# Patient Record
Sex: MALE | Race: WHITE | URBAN - METROPOLITAN AREA
[De-identification: names, ages, dates, MRNs, and addresses within clinical notes are randomized per-mention and may not be internally consistent; named-entity substitution may affect disease eponyms.]

---

## 2017-07-28 ENCOUNTER — OUTPATIENT (OUTPATIENT)
Dept: OUTPATIENT SERVICES | Facility: HOSPITAL | Age: 32
LOS: 1 days | Discharge: HOME | End: 2017-07-28

## 2017-07-28 ENCOUNTER — APPOINTMENT (OUTPATIENT)
Dept: INTERNAL MEDICINE | Facility: CLINIC | Age: 32
End: 2017-07-28

## 2017-07-28 VITALS
DIASTOLIC BLOOD PRESSURE: 76 MMHG | HEIGHT: 66 IN | HEART RATE: 69 BPM | BODY MASS INDEX: 29.25 KG/M2 | SYSTOLIC BLOOD PRESSURE: 117 MMHG | WEIGHT: 182 LBS

## 2017-07-28 DIAGNOSIS — Z00.01 ENCOUNTER FOR GENERAL ADULT MEDICAL EXAMINATION WITH ABNORMAL FINDINGS: ICD-10-CM

## 2017-07-28 DIAGNOSIS — G43.909 MIGRAINE, UNSPECIFIED, NOT INTRACTABLE, WITHOUT STATUS MIGRAINOSUS: ICD-10-CM

## 2017-08-12 ENCOUNTER — RESULT REVIEW (OUTPATIENT)
Age: 32
End: 2017-08-12

## 2017-08-17 LAB
25(OH)D3 SERPL-MCNC: 20 NG/ML
ESTIMATED AVERGAGE GLUCOSE (NORTH): 114 MG/DL
HBA1C MFR BLD: 5.6 %
HIV1+2 AB SPEC QL IA.RAPID: NONREACTIVE
T PALLIDUM AB SER QL: NEGATIVE
T PALLIDUM AB SER-ACNC: <0.1 INDEX
VITAMIN D2 SERPL-MCNC: <4 NG/ML
VITAMIN D3 SERPL-MCNC: 20 NG/ML

## 2017-09-05 LAB
C TRACH IGA TITR SER IF: NORMAL
C TRACH IGA+IGG+IGM SER-IMP: NORMAL
C TRACH IGG TITR SER IF: NORMAL
C TRACH IGM TITR SER IF: NORMAL
NEISSERIA GONORRHOEAE AB [TITER] IN SERUM BY COMPLEMENT FIXATION: NORMAL

## 2018-01-02 ENCOUNTER — OUTPATIENT (OUTPATIENT)
Dept: OUTPATIENT SERVICES | Facility: HOSPITAL | Age: 33
LOS: 1 days | Discharge: HOME | End: 2018-01-02

## 2018-01-02 ENCOUNTER — APPOINTMENT (OUTPATIENT)
Dept: INTERNAL MEDICINE | Facility: CLINIC | Age: 33
End: 2018-01-02

## 2018-01-02 VITALS
HEART RATE: 69 BPM | DIASTOLIC BLOOD PRESSURE: 71 MMHG | HEIGHT: 66 IN | BODY MASS INDEX: 29.09 KG/M2 | WEIGHT: 181 LBS | SYSTOLIC BLOOD PRESSURE: 104 MMHG

## 2018-01-02 DIAGNOSIS — R07.89 OTHER CHEST PAIN: ICD-10-CM

## 2018-01-02 DIAGNOSIS — R21 RASH AND OTHER NONSPECIFIC SKIN ERUPTION: ICD-10-CM

## 2018-01-02 DIAGNOSIS — E55.9 VITAMIN D DEFICIENCY, UNSPECIFIED: ICD-10-CM

## 2018-01-02 DIAGNOSIS — Z00.00 ENCOUNTER FOR GENERAL ADULT MEDICAL EXAMINATION W/OUT ABNORMAL FINDINGS: ICD-10-CM

## 2018-01-02 DIAGNOSIS — R79.89 OTHER SPECIFIED ABNORMAL FINDINGS OF BLOOD CHEMISTRY: ICD-10-CM

## 2018-01-02 DIAGNOSIS — R51 HEADACHE: ICD-10-CM

## 2018-01-02 RX ORDER — UBIDECARENONE/VIT E ACET 100MG-5
50 MCG CAPSULE ORAL
Qty: 30 | Refills: 2 | Status: ACTIVE | COMMUNITY
Start: 2018-01-02

## 2018-01-23 ENCOUNTER — OUTPATIENT (OUTPATIENT)
Dept: OUTPATIENT SERVICES | Facility: HOSPITAL | Age: 33
LOS: 1 days | Discharge: HOME | End: 2018-01-23

## 2018-01-23 ENCOUNTER — APPOINTMENT (OUTPATIENT)
Dept: CARDIOLOGY | Facility: CLINIC | Age: 33
End: 2018-01-23

## 2018-01-23 VITALS
HEART RATE: 80 BPM | BODY MASS INDEX: 28.28 KG/M2 | HEIGHT: 66 IN | WEIGHT: 176 LBS | DIASTOLIC BLOOD PRESSURE: 75 MMHG | SYSTOLIC BLOOD PRESSURE: 110 MMHG

## 2018-02-20 ENCOUNTER — OUTPATIENT (OUTPATIENT)
Dept: OUTPATIENT SERVICES | Facility: HOSPITAL | Age: 33
LOS: 1 days | Discharge: HOME | End: 2018-02-20

## 2018-02-20 ENCOUNTER — APPOINTMENT (OUTPATIENT)
Dept: CARDIOLOGY | Facility: CLINIC | Age: 33
End: 2018-02-20

## 2018-02-20 VITALS
DIASTOLIC BLOOD PRESSURE: 78 MMHG | WEIGHT: 177.06 LBS | HEART RATE: 70 BPM | BODY MASS INDEX: 28.45 KG/M2 | SYSTOLIC BLOOD PRESSURE: 110 MMHG | HEIGHT: 66 IN

## 2018-02-20 DIAGNOSIS — R07.89 OTHER CHEST PAIN: ICD-10-CM

## 2018-03-05 ENCOUNTER — APPOINTMENT (OUTPATIENT)
Age: 33
End: 2018-03-05

## 2018-04-16 ENCOUNTER — APPOINTMENT (OUTPATIENT)
Age: 33
End: 2018-04-16

## 2018-04-16 ENCOUNTER — OUTPATIENT (OUTPATIENT)
Dept: OUTPATIENT SERVICES | Facility: HOSPITAL | Age: 33
LOS: 1 days | Discharge: HOME | End: 2018-04-16

## 2018-04-16 VITALS
DIASTOLIC BLOOD PRESSURE: 68 MMHG | WEIGHT: 182 LBS | SYSTOLIC BLOOD PRESSURE: 100 MMHG | HEART RATE: 74 BPM | BODY MASS INDEX: 29.38 KG/M2

## 2018-04-24 ENCOUNTER — OUTPATIENT (OUTPATIENT)
Dept: OUTPATIENT SERVICES | Facility: HOSPITAL | Age: 33
LOS: 1 days | Discharge: HOME | End: 2018-04-24

## 2018-04-24 ENCOUNTER — LABORATORY RESULT (OUTPATIENT)
Age: 33
End: 2018-04-24

## 2018-04-24 DIAGNOSIS — R74.8 ABNORMAL LEVELS OF OTHER SERUM ENZYMES: ICD-10-CM

## 2018-04-24 DIAGNOSIS — R79.89 OTHER SPECIFIED ABNORMAL FINDINGS OF BLOOD CHEMISTRY: ICD-10-CM

## 2018-04-24 LAB
IRON SATN MFR SERPL: 49 %
IRON SERPL-MCNC: 182 UG/DL
TIBC SERPL-MCNC: 374 UG/DL
UIBC SERPL-MCNC: 192 UG/DL

## 2018-04-25 LAB
CERULOPLASMIN SERPL-MCNC: 28 MG/DL
FERRITIN SERPL-MCNC: 149 NG/ML
HAV IGG+IGM SER QL: REACTIVE
HAV IGM SER QL: NONREACTIVE
HBV CORE IGG+IGM SER QL: NONREACTIVE
HBV DNA # SERPL NAA+PROBE: NOT DETECTED
HBV SURFACE AB SER QL: REACTIVE
HBV SURFACE AG SER QL: NONREACTIVE
HCV AB SER QL: NONREACTIVE
HCV S/CO RATIO: 0.08 S/CO
HEPB DNA PCR LOG: NOT DETECTED LOGIU/ML
IGA SER QL IEP: 271 MG/DL
IGG SER QL IEP: 1040 MG/DL
IGM SER QL IEP: 54 MG/DL
MITOCHONDRIA AB SER IF-ACNC: NORMAL
SMOOTH MUSCLE AB SER QL IF: ABNORMAL

## 2018-04-26 LAB
ANA PAT FLD IF-IMP: ABNORMAL
ANACR T: ABNORMAL

## 2018-06-05 ENCOUNTER — APPOINTMENT (OUTPATIENT)
Dept: INTERNAL MEDICINE | Facility: CLINIC | Age: 33
End: 2018-06-05

## 2018-06-18 ENCOUNTER — APPOINTMENT (OUTPATIENT)
Dept: HEPATOLOGY | Facility: CLINIC | Age: 33
End: 2018-06-18

## 2019-04-26 ENCOUNTER — EMERGENCY (EMERGENCY)
Facility: HOSPITAL | Age: 34
LOS: 0 days | Discharge: HOME | End: 2019-04-26
Admitting: EMERGENCY MEDICINE
Payer: SUBSIDIZED

## 2019-04-26 VITALS
RESPIRATION RATE: 18 BRPM | OXYGEN SATURATION: 99 % | DIASTOLIC BLOOD PRESSURE: 83 MMHG | TEMPERATURE: 98 F | SYSTOLIC BLOOD PRESSURE: 130 MMHG | HEART RATE: 93 BPM

## 2019-04-26 VITALS — WEIGHT: 190.04 LBS

## 2019-04-26 DIAGNOSIS — Z23 ENCOUNTER FOR IMMUNIZATION: ICD-10-CM
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
## 2019-04-26 DIAGNOSIS — M54.5 LOW BACK PAIN: ICD-10-CM
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
## 2019-04-26 DIAGNOSIS — S61.011A LACERATION WITHOUT FOREIGN BODY OF RIGHT THUMB WITHOUT DAMAGE TO NAIL, INITIAL ENCOUNTER: ICD-10-CM
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
## 2019-04-26 DIAGNOSIS — M54.9 DORSALGIA, UNSPECIFIED: ICD-10-CM

## 2019-04-26 PROCEDURE — 99283 EMERGENCY DEPT VISIT LOW MDM: CPT | Mod: 25

## 2019-04-26 PROCEDURE — 12001 RPR S/N/AX/GEN/TRNK 2.5CM/<: CPT

## 2019-04-26 RX ORDER — METHOCARBAMOL 500 MG/1
1 TABLET, FILM COATED ORAL
Qty: 21 | Refills: 0 | OUTPATIENT
Start: 2019-04-26 | End: 2019-05-02

## 2019-04-26 RX ORDER — METHOCARBAMOL 500 MG/1
1000 TABLET, FILM COATED ORAL ONCE
Qty: 0 | Refills: 0 | Status: COMPLETED | OUTPATIENT
Start: 2019-04-26 | End: 2019-04-26

## 2019-04-26 RX ORDER — METHOCARBAMOL 500 MG/1
2 TABLET, FILM COATED ORAL
Qty: 30 | Refills: 0 | OUTPATIENT
Start: 2019-04-26 | End: 2019-04-30

## 2019-04-26 RX ORDER — KETOROLAC TROMETHAMINE 30 MG/ML
30 SYRINGE (ML) INJECTION ONCE
Qty: 0 | Refills: 0 | Status: DISCONTINUED | OUTPATIENT
Start: 2019-04-26 | End: 2019-04-26

## 2019-04-26 RX ORDER — TETANUS TOXOID, REDUCED DIPHTHERIA TOXOID AND ACELLULAR PERTUSSIS VACCINE, ADSORBED 5; 2.5; 8; 8; 2.5 [IU]/.5ML; [IU]/.5ML; UG/.5ML; UG/.5ML; UG/.5ML
0.5 SUSPENSION INTRAMUSCULAR ONCE
Qty: 0 | Refills: 0 | Status: COMPLETED | OUTPATIENT
Start: 2019-04-26 | End: 2019-04-26

## 2019-04-26 RX ORDER — DEXAMETHASONE 0.5 MG/5ML
10 ELIXIR ORAL ONCE
Qty: 0 | Refills: 0 | Status: COMPLETED | OUTPATIENT
Start: 2019-04-26 | End: 2019-04-26

## 2019-04-26 RX ADMIN — TETANUS TOXOID, REDUCED DIPHTHERIA TOXOID AND ACELLULAR PERTUSSIS VACCINE, ADSORBED 0.5 MILLILITER(S): 5; 2.5; 8; 8; 2.5 SUSPENSION INTRAMUSCULAR at 20:32

## 2019-04-26 RX ADMIN — METHOCARBAMOL 1000 MILLIGRAM(S): 500 TABLET, FILM COATED ORAL at 20:34

## 2019-04-26 RX ADMIN — Medication 30 MILLIGRAM(S): at 20:32

## 2019-04-26 RX ADMIN — Medication 10 MILLIGRAM(S): at 20:33

## 2019-04-26 NOTE — ED PROVIDER NOTE - CLINICAL SUMMARY MEDICAL DECISION MAKING FREE TEXT BOX
Patient here for left sided back pain after injury. No red flag signs or symptoms. recommend nsaids, muscle relaxant, follow up with rehab clinic. Patient also with laceration to right thumb, patient given tdap. Declining sutures but will accept dermabond.

## 2019-04-26 NOTE — ED ADULT NURSE NOTE - NSIMPLEMENTINTERV_GEN_ALL_ED
Implemented All Universal Safety Interventions:  [place] to call system. Call bell, personal items and telephone within reach. Instruct patient to call for assistance. Room bathroom lighting operational. Non-slip footwear when patient is off stretcher. Physically safe environment: no spills, clutter or unnecessary equipment. Stretcher in lowest position, wheels locked, appropriate side rails in place.

## 2019-04-26 NOTE — ED PROVIDER NOTE - NSFOLLOWUPCLINICS_GEN_ALL_ED_FT
[facility]  Rehabilitation  [address] [place] [address]  Phone: [phone]  Fax:   Follow Up Time: 1-3 Days

## 2019-04-26 NOTE — ED PROVIDER NOTE - NS ED ROS FT
Constitutional: no fever, chills   Cardiovascular: no chest pain, no sob, no syncope  Respiratory: no cough, no shortness of breath.  Gastrointestinal: no nausea, vomiting or diarrhea. no abdominal pain.   : no urinary sxs, no flank pain.  Musculoskeletal: + left sided lower back pain  Integumentary: +laceration right thumb  Neurological: no head injury. no headache, no dizziness, no visual changes, no UE/LE weakness or paresthesias. no neck pain or stiffness.

## 2019-04-26 NOTE — ED PROVIDER NOTE - NSFOLLOWUPINSTRUCTIONS_ED_ALL_ED_FT
Back Pain    Back pain is very common in adults. The cause of back pain is rarely dangerous and the pain often gets better over time. The cause of your back pain may not be known and may include strain of muscles or ligaments, degeneration of the spinal disks, or arthritis. Occasionally the pain may radiate down your leg(s). Over-the-counter medicines to reduce pain and inflammation are often the most helpful. Stretching and remaining active frequently helps the healing process.     SEEK IMMEDIATE MEDICAL CARE IF YOU HAVE ANY OF THE FOLLOWING SYMPTOMS: bowel or bladder control problems, unusual weakness or numbness in your arms or legs, nausea or vomiting, abdominal pain, fever, dizziness/lightheadedness.    Laceration    A laceration is a cut that goes through all of the layers of the skin and into the tissue that is right under the skin. Some lacerations heal on their own. Others need to be closed with skin adhesive strips, skin glue, stitches (sutures), or staples. Proper laceration care minimizes the risk of infection and helps the laceration to heal better.  If non-absorbable stitches or staples have been placed, they must be taken out within the time frame instructed by your healthcare provider.    SEEK IMMEDIATE MEDICAL CARE IF YOU HAVE ANY OF THE FOLLOWING SYMPTOMS: swelling around the wound, worsening pain, drainage from the wound, red streaking going away from your wound, inability to move finger or toe near the laceration, or discoloration of skin near the laceration.

## 2019-04-26 NOTE — ED ADULT NURSE NOTE - CHPI ED NUR SYMPTOMS NEG
no constipation/no difficulty bearing weight/no bowel dysfunction/no bladder dysfunction/no tingling/no motor function loss/no numbness/no anorexia/no neck tenderness/no fatigue

## 2019-04-26 NOTE — ED PROVIDER NOTE - PHYSICAL EXAMINATION
GENERAL:  well appearing, non-toxic male in no acute distress  SKIN: skin warm, pink and dry. MMM. 0.5 cm laceration to right distal thumb. cap refill < 2 sec   HEAD: NC, AT  NECK: Neck supple. No midline cervical tenderness. FROM of neck.   PULM: CTAB. Normal respiratory effort. No respiratory distress. No wheezes, stridor, rales or rhonchi. No retractions  CV: RRR, no M/R/G.   MSK: + left lumbar paraspinal tenderness, + left side straight leg raise. no midline vertebral tenderness, no chest wall tenderness. FROM of right thumb against resistance, no evidence of tendon involvement. No edema, erythema, cyanosis. Distal pulses intact.  NEURO: A+Ox3, no sensory/motor deficits. no saddle anesthesia. Strength 5/5 throughout bilaterally. Sensation of right thumb intact.   PSYCH: appropriate behavior, cooperative.

## 2019-04-26 NOTE — ED PROVIDER NOTE - OBJECTIVE STATEMENT
34 yo male presents to the ED c/o left sided lower back pain after he twisted his back to catch a falling shelf earlier today. Patient now with left sided lower back pain with radiation down leg. No head trauma. no additional injury. no prior back issues/injury. 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 Laceration still mildly bleeding. Denies fever, chills, chest pain, sob, abd pain, N/V, urinary sxs, flank pain. no UE/LE weakness or paresthesias, no saddle anesthesia, no bowel or bladder incontinence/retention, no prior back surgery.

## 2019-04-26 NOTE — ED ADULT NURSE NOTE - CHIEF COMPLAINT QUOTE
left side pain since yesterday, moved quickly out of the way from a falling shelf. C/o left side and back pain

## 2019-08-19 ENCOUNTER — EMERGENCY (EMERGENCY)
Facility: HOSPITAL | Age: 34
LOS: 0 days | Discharge: HOME | End: 2019-08-19
Attending: EMERGENCY MEDICINE | Admitting: EMERGENCY MEDICINE
Payer: SUBSIDIZED

## 2019-08-19 VITALS
SYSTOLIC BLOOD PRESSURE: 130 MMHG | TEMPERATURE: 98 F | HEART RATE: 90 BPM | OXYGEN SATURATION: 99 % | RESPIRATION RATE: 18 BRPM | DIASTOLIC BLOOD PRESSURE: 82 MMHG

## 2019-08-19 DIAGNOSIS — S61.001A UNSPECIFIED OPEN WOUND OF RIGHT THUMB WITHOUT DAMAGE TO NAIL, INITIAL ENCOUNTER: ICD-10-CM
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
## 2019-08-19 DIAGNOSIS — S61.011A LACERATION WITHOUT FOREIGN BODY OF RIGHT THUMB WITHOUT DAMAGE TO NAIL, INITIAL ENCOUNTER: ICD-10-CM
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
## 2019-08-19 PROCEDURE — 73140 X-RAY EXAM OF FINGER(S): CPT | Mod: 26,RT

## 2019-08-19 PROCEDURE — 99283 EMERGENCY DEPT VISIT LOW MDM: CPT

## 2019-08-19 NOTE — ED PROVIDER NOTE - NSFOLLOWUPCLINICS_GEN_ALL_ED_FT
[facility]  Hand  [address], [address]  [place] [address]  Phone: [phone]  Fax:   Follow Up Time:

## 2019-08-19 NOTE — ED PROVIDER NOTE - PHYSICAL EXAMINATION
CONSTITUTIONAL: well-appearing, in NAD  SKIN: Warm dry, normal skin turgor, avulsion of tip of R thumb, no nail involvement, no visible bone or tendon.   HEAD: NCAT  EXT: Full ROM, no bony tenderness, cap refill < 2sec.  NEURO: normal motor. normal sensory.   PSYCH: Cooperative, appropriate.

## 2019-08-19 NOTE — ED PROVIDER NOTE - NS ED ROS FT
Constitutional:  (-) fever, (-) chills, (-) lethargy  MS:  (-) back pain (-) joint pain.  Neuro:  (-) headache (-) numbness (-) tingling (-) focal weakness  Skin:  (+) laceration  Except as documented in the HPI,  all other systems are negative

## 2019-08-19 NOTE — ED ADULT NURSE NOTE - OBJECTIVE STATEMENT
Patient c/o laceration to right hand fifth finger. Patient cut finger on saw while at work this morning. No obvious signs of deformity noted. +CMS in right hand + cap refill.

## 2019-08-19 NOTE — ED PROVIDER NOTE - OBJECTIVE STATEMENT
33 y.o M w/ no PMHx p/w laceration to R thumb that he sustain at 1130 AM while using a table saw on the job. Bleeding controlled. Last tetanus 2 months ago. No numbness or tingling, no redness, no fever.

## 2019-08-19 NOTE — ED PROVIDER NOTE - ATTENDING CONTRIBUTION TO CARE
33yM p/w laceration to R thumb sustained ~1hr prior to arrival while using table saw at work.  Bleeding controlled.  Last tetanus 2mo ago.    exam w/ avulsion to tip of R thumb - oozing w/o heavy bleeding, no nail injury nor visualized bone    xray  lac repair

## 2019-08-19 NOTE — ED PROVIDER NOTE - CLINICAL SUMMARY MEDICAL DECISION MAKING FREE TEXT BOX
33yM otherwise healthy p/w avulsion to pad of R thumb from table saw injury.  No nail injury. Xray w/o apparent fx.  Tetanus UTD.  Wound repaired at bedside w/ hemostasis.  See procedure note for details.  Ok to dc with o/p wound care and f/u, return precautions.

## 2019-08-19 NOTE — ED ADULT NURSE NOTE - CHPI ED NUR SYMPTOMS NEG
no blood in mucus/no rectal pain/no vomiting/no drainage/no chills/no purulent drainage/no redness/no fever

## 2024-05-18 ENCOUNTER — EMERGENCY (EMERGENCY)
Facility: HOSPITAL | Age: 39
LOS: 0 days | Discharge: HOME | End: 2024-05-18
Attending: EMERGENCY MEDICINE
Payer: COMMERCIAL

## 2024-05-18 VITALS
HEART RATE: 93 BPM | SYSTOLIC BLOOD PRESSURE: 108 MMHG | RESPIRATION RATE: 18 BRPM | DIASTOLIC BLOOD PRESSURE: 65 MMHG | TEMPERATURE: 98 F | OXYGEN SATURATION: 100 %

## 2024-05-18 DIAGNOSIS — H57.89 OTHER SPECIFIED DISORDERS OF EYE AND ADNEXA: ICD-10-CM
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
## 2024-05-18 DIAGNOSIS — T15.92XA FOREIGN BODY ON EXTERNAL EYE, PART UNSPECIFIED, LEFT EYE, INITIAL ENCOUNTER: ICD-10-CM
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
## 2024-05-18 DIAGNOSIS — H57.12 OCULAR PAIN, LEFT EYE: ICD-10-CM

## 2024-05-18 DIAGNOSIS — S05.02XA INJURY OF CONJUNCTIVA AND CORNEAL ABRASION WITHOUT FOREIGN BODY, LEFT EYE, INITIAL ENCOUNTER: ICD-10-CM

## 2024-05-18 DIAGNOSIS — H53.142 VISUAL DISCOMFORT, LEFT EYE: ICD-10-CM

## 2024-05-18 DIAGNOSIS — Z23 ENCOUNTER FOR IMMUNIZATION: ICD-10-CM

## 2024-05-18 PROCEDURE — 99283 EMERGENCY DEPT VISIT LOW MDM: CPT | Mod: 25

## 2024-05-18 PROCEDURE — 90471 IMMUNIZATION ADMIN: CPT

## 2024-05-18 PROCEDURE — 99284 EMERGENCY DEPT VISIT MOD MDM: CPT

## 2024-05-18 PROCEDURE — 90715 TDAP VACCINE 7 YRS/> IM: CPT

## 2024-05-18 PROCEDURE — 65235 REMOVE FOREIGN BODY FROM EYE: CPT

## 2024-05-18 RX ORDER — POLYMYXIN B SULF/TRIMETHOPRIM 10000-1/ML
1 DROPS OPHTHALMIC (EYE) ONCE
Refills: 0 | Status: COMPLETED | OUTPATIENT
Start: 2024-05-18 | End: 2024-05-18

## 2024-05-18 RX ORDER — TETANUS TOXOID, REDUCED DIPHTHERIA TOXOID AND ACELLULAR PERTUSSIS VACCINE, ADSORBED 5; 2.5; 8; 8; 2.5 [IU]/.5ML; [IU]/.5ML; UG/.5ML; UG/.5ML; UG/.5ML
0.5 SUSPENSION INTRAMUSCULAR ONCE
Refills: 0 | Status: COMPLETED | OUTPATIENT
Start: 2024-05-18 | End: 2024-05-18

## 2024-05-18 RX ADMIN — TETANUS TOXOID, REDUCED DIPHTHERIA TOXOID AND ACELLULAR PERTUSSIS VACCINE, ADSORBED 0.5 MILLILITER(S): 5; 2.5; 8; 8; 2.5 SUSPENSION INTRAMUSCULAR at 13:20

## 2024-05-18 RX ADMIN — Medication 1 DROP(S): at 13:40

## 2024-05-18 NOTE — ED PROCEDURE NOTE - CPROC ED INFORMED CONSENT1
Benefits, risks, and possible complications of procedure explained to patient/caregiver who verbalized understanding and gave verbal consent.
show

## 2024-05-18 NOTE — ED PROCEDURE NOTE - PROCEDURE ADDITIONAL DETAILS
Removed 90% of foreign body. No complication. Remaining foreign body imbedded. Patient will use Polytrim and follow up with opthalmology.

## 2024-05-18 NOTE — ED PROVIDER NOTE - PHYSICAL EXAMINATION
Physical Exam    Constitutional: No acute distress.   Eyes: left eye conjunctival and scleral injection. Foreign body in center of eye with staining.   ENT: No sinus tenderness. No nasal discharge. No oropharyngeal erythema, edema, or exudates. Uvula midline.   Cardiovascular: Regular rate, regular rhythm. No noted murmurs rubs or gallops.  Respiratory: unlabored respiratory effort, clear to auscultation bilaterally no wheezing, rales or rhonchi  Gastrointestinal: Normal bowel sounds. soft, non distended, non-tender abdomen.   Musculoskeletal: supple neck, no midline tenderness. No joint or bony deformity.   Integumentary: warm, dry, no rash  Neurologic: awake, alert, cranial nerves II-XII grossly intact, extremities’ motor and sensory functions grossly intact

## 2024-05-18 NOTE — ED PROVIDER NOTE - NSFOLLOWUPINSTRUCTIONS_ED_ALL_ED_FT
Please use Polytrim 4 times per day in the left eye for 7 days. Follow up with Opthalmology on Monday. Use ibuprofen 600mg every 8 hours as needed for pain.     Eye Foreign Body    WHAT YOU NEED TO KNOW:    You may have pain, sensitivity to light, or blurry vision for a few days.     DISCHARGE INSTRUCTIONS:    Return to the emergency department if:     You suddenly lose your vision.       You have severe eye pain.     Contact your healthcare provider or ophthalmologist if:     You have new or worse eye swelling.       Your symptoms do not get better, even after the foreign body is removed.       You have white or yellow fluid draining from your eye.       You have questions or concerns about your condition or care.     Medicines: You may need any of the following:     Eye drops or eye ointment may be given to prevent an infection and decrease pain.       NSAIDs, such as ibuprofen, help decrease swelling, pain, and fever. NSAIDs can cause stomach bleeding or kidney problems in certain people. If you take blood thinner medicine, always ask your healthcare provider if NSAIDs are safe for you. Always read the medicine label and follow directions.      Prescription pain medicine may be given. Ask your healthcare provider how to take this medicine safely. Some prescription pain medicines contain acetaminophen. Do not take other medicines that contain acetaminophen without talking to your healthcare provider. Too much acetaminophen may cause liver damage. Prescription pain medicine may cause constipation. Ask your healthcare provider how to prevent or treat constipation.       Take your medicine as directed. Contact your healthcare provider if you think your medicine is not helping or if you have side effects. Tell him of her if you are allergic to any medicine. Keep a list of the medicines, vitamins, and herbs you take. Include the amounts, and when and why you take them. Bring the list or the pill bottles to follow-up visits. Carry your medicine list with you in case of an emergency.    Help your eye heal:     Do not rub your eye. This may cause more damage or infection.       Do not wear your contacts lenses until your eye heals. Ask your healthcare provider how long to follow this direction.       Wear sunglasses as directed. Sunglasses help protect the eye and decrease sensitivity to light.     Prevent another EFB:     Wear safety glasses, eye shields, or goggles. These items can prevent eye injury. Make sure the eyewear wraps around the sides of your face. Wear these items while you work with chemicals, metal, wood, or bodily fluids such as blood. Also wear protective eyewear during sports such as racquetball or swimming. Do not use regular eye glasses for eye protection. They will not protect your eyes from foreign bodies or chemicals.       Use contact lenses as directed. Wash your hands before you clean, insert, or remove your contacts. Insert and remove contact lenses correctly. Clean and change your contacts as directed to help prevent eye damage or infection.     Follow up with your healthcare provider or ophthalmologist in 1 to 2 days: Write down your questions so you remember to ask them during your visits.       © Copyright [employer] 2019 All illustrations and images included in CareNotes are the copyrighted property of "[employer] or [employer].

## 2024-05-18 NOTE — ED PROVIDER NOTE - OBJECTIVE STATEMENT
38 year old male presents to the ED with eye redness. 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 Yesterday he felt some left eye discomfort and today noticed significant redness with pain, photophobia and excessive tearing. Denies any trauma, fever, chills, chest pain, shortness of breath, headaches, nausea, vomiting, facial swelling, rash.

## 2024-05-18 NOTE — ED PROVIDER NOTE - NSFOLLOWUPCLINICS_GEN_ALL_ED_FT
[facility]  Ophthalmolgy  [address], [address]  [place] [address]  Phone: [phone]  Fax:

## 2024-05-18 NOTE — ED PROVIDER NOTE - PATIENT PORTAL LINK FT
You can access the FollowMyHealth Patient Portal offered by [facility] by registering at the following website: [email or web address]. By joining [employer]’s FollowMyHealth portal, you will also be able to view your health information using other applications (apps) compatible with our system.

## 2024-05-18 NOTE — ED PROVIDER NOTE - PROGRESS NOTE DETAILS
WILIAM Ortiz - able to remove most of the foreign body with insulin needle and qtip however small piece remains. Patient will start Polytrim and follow up with optho on Monday.

## 2024-05-18 NOTE — ED PROVIDER NOTE - ATTENDING APP SHARED VISIT CONTRIBUTION OF CARE
38-year-old male no past medical history presents with left eye redness discomfort tearing since yesterday.  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  No contacts or glasses.  No vision changes.  No eye pain.  No fever.    On exam, AFVSS, Well appearing, No acute distress, NCAT, EOMI, PERRLA, MMM, Neck supple, tiny punctate foreign body to left cornea, no rust ring, positive corneal abrasion, negative Brandon's mild conjunctival injection and tearing, no other foreign bodies,, AAOx3, No Focal Deficits, No LE edema or calf TTP,    A/P; foreign body left eye corneal abrasion most of it removed however still has small tiny bit in place unable to be removed, negative Brandon's feels better no visual changes, DC home with antibiotic eyedrops and eye clinic follow-up this week 1 to 2 days, strict return precautions

## 2025-04-03 NOTE — ED ADULT TRIAGE NOTE - TEMP AT ED ARRIVAL (C)
Problem: Chronic Conditions and Co-morbidities  Goal: Patient's chronic conditions and co-morbidity symptoms are monitored and maintained or improved  Outcome: Progressing  Flowsheets (Taken 4/3/2025 0959)  Care Plan - Patient's Chronic Conditions and Co-Morbidity Symptoms are Monitored and Maintained or Improved:   Monitor and assess patient's chronic conditions and comorbid symptoms for stability, deterioration, or improvement   Collaborate with multidisciplinary team to address chronic and comorbid conditions and prevent exacerbation or deterioration   Update acute care plan with appropriate goals if chronic or comorbid symptoms are exacerbated and prevent overall improvement and discharge     Problem: Safety - Adult  Goal: Free from fall injury  Outcome: Progressing  Flowsheets (Taken 4/3/2025 0959)  Free From Fall Injury: Instruct family/caregiver on patient safety  Note: Orthostatic vital signs obtained at start of shift - see flowsheet for details.  Pt does not meet criteria for orthostasis.  Pt is a Med fall risk. See Bermudez Fall Score and ABCDS Injury Risk assessments.    - Screening for Orthostasis AND not a [place] Risk per BERMUDEZ/ABCDS: Pt bed is in low position, side rails up, call light and belongings are in reach.  Fall risk light is on outside pts room.  Pt encouraged to call for assistance as needed. Will continue with hourly rounds for PO intake, pain needs, toileting and repositioning as needed.      Problem: ABCDS Injury Assessment  Goal: Absence of physical injury  Outcome: Progressing  Flowsheets (Taken 4/3/2025 0959)  Absence of Physical Injury: Implement safety measures based on patient assessment      36.8